# Patient Record
Sex: MALE | URBAN - METROPOLITAN AREA
[De-identification: names, ages, dates, MRNs, and addresses within clinical notes are randomized per-mention and may not be internally consistent; named-entity substitution may affect disease eponyms.]

---

## 2017-10-04 ENCOUNTER — IMPORTED ENCOUNTER (OUTPATIENT)
Dept: URBAN - METROPOLITAN AREA CLINIC 9 | Facility: CLINIC | Age: 77
End: 2017-10-04

## 2018-01-17 ENCOUNTER — IMPORTED ENCOUNTER (OUTPATIENT)
Dept: URBAN - METROPOLITAN AREA CLINIC 9 | Facility: CLINIC | Age: 78
End: 2018-01-17

## 2018-02-02 ENCOUNTER — IMPORTED ENCOUNTER (OUTPATIENT)
Dept: URBAN - METROPOLITAN AREA CLINIC 9 | Facility: CLINIC | Age: 78
End: 2018-02-02

## 2018-02-15 ENCOUNTER — IMPORTED ENCOUNTER (OUTPATIENT)
Dept: URBAN - METROPOLITAN AREA CLINIC 9 | Facility: CLINIC | Age: 78
End: 2018-02-15

## 2018-02-23 ENCOUNTER — IMPORTED ENCOUNTER (OUTPATIENT)
Dept: URBAN - METROPOLITAN AREA CLINIC 9 | Facility: CLINIC | Age: 78
End: 2018-02-23

## 2018-03-08 ENCOUNTER — IMPORTED ENCOUNTER (OUTPATIENT)
Dept: URBAN - METROPOLITAN AREA CLINIC 9 | Facility: CLINIC | Age: 78
End: 2018-03-08

## 2018-09-18 ENCOUNTER — IMPORTED ENCOUNTER (OUTPATIENT)
Dept: URBAN - METROPOLITAN AREA CLINIC 9 | Facility: CLINIC | Age: 78
End: 2018-09-18

## 2019-03-05 ENCOUNTER — IMPORTED ENCOUNTER (OUTPATIENT)
Dept: URBAN - METROPOLITAN AREA CLINIC 9 | Facility: CLINIC | Age: 79
End: 2019-03-05

## 2020-03-16 ENCOUNTER — IMPORTED ENCOUNTER (OUTPATIENT)
Dept: URBAN - METROPOLITAN AREA CLINIC 9 | Facility: CLINIC | Age: 80
End: 2020-03-16

## 2021-10-18 ASSESSMENT — VISUAL ACUITY
OD_CC: 20/20 SN
OD_SC: 20/20 SN
OD_SC: 20/20 - SN
OS_CC: 20/25 SN
OS_SC: 20/30 -2 SN
OS_SC: 20/25 - SN
OS_CC: 20/25 SN
OD_SC: 20/20 -2 SN
OS_SC: 20/25 SN
OS_SC: 20/30 - SN
OD_SC: 20/20 SN
OS_SC: 20/20 - SN
OD_SC: 20/30 -2 SN
OS_SC: 20/40 SN
OD_SC: 20/25 SN
OS_SC: 20/20 - SN
OD_SC: 20/40 SN
OS_CC: 20/20 SN
OD_CC: 20/25 SN
OD_CC: 20/25 SN
OD_SC: 20/40 SN

## 2021-10-18 ASSESSMENT — TONOMETRY
OD_IOP_MMHG: 13
OS_IOP_MMHG: 13
OD_IOP_MMHG: 14
OS_IOP_MMHG: 13
OD_IOP_MMHG: 10
OS_IOP_MMHG: 14
OS_IOP_MMHG: 14
OD_IOP_MMHG: 13
OS_IOP_MMHG: 28
OD_IOP_MMHG: 27
OD_IOP_MMHG: 14
OS_IOP_MMHG: 27
OD_IOP_MMHG: 24
OD_IOP_MMHG: 16
OD_IOP_MMHG: 12
OS_IOP_MMHG: 11
OS_IOP_MMHG: 14

## 2021-11-09 NOTE — PATIENT DISCUSSION
SIGNIFICANT DERMATOCHALASIS UPPER EYELIDS, OU; RECOMMEND UPPER EYELID BLEPHAROPLASTY, OU. Discussed the risks and benefits of surgical procedure today, patient understands and wishes to proceed at his convenience. The patient has had all questions and concerns addressed today.